# Patient Record
Sex: FEMALE | Race: WHITE | Employment: FULL TIME | URBAN - METROPOLITAN AREA
[De-identification: names, ages, dates, MRNs, and addresses within clinical notes are randomized per-mention and may not be internally consistent; named-entity substitution may affect disease eponyms.]

---

## 2023-04-07 ENCOUNTER — HOSPITAL ENCOUNTER (EMERGENCY)
Facility: HOSPITAL | Age: 23
Discharge: HOME | End: 2023-04-08
Attending: EMERGENCY MEDICINE

## 2023-04-07 DIAGNOSIS — K12.0 APHTHOUS ULCER: Primary | ICD-10-CM

## 2023-04-07 PROCEDURE — 99283 EMERGENCY DEPT VISIT LOW MDM: CPT

## 2023-04-08 VITALS
RESPIRATION RATE: 18 BRPM | WEIGHT: 130 LBS | SYSTOLIC BLOOD PRESSURE: 166 MMHG | OXYGEN SATURATION: 100 % | HEIGHT: 64 IN | BODY MASS INDEX: 22.2 KG/M2 | DIASTOLIC BLOOD PRESSURE: 101 MMHG | TEMPERATURE: 98 F | HEART RATE: 79 BPM

## 2023-04-08 RX ORDER — LIDOCAINE HYDROCHLORIDE 20 MG/ML
10 SOLUTION OROPHARYNGEAL ONCE
Status: COMPLETED | OUTPATIENT
Start: 2023-04-08 | End: 2023-04-08

## 2023-04-08 RX ORDER — BUPROPION HYDROCHLORIDE 300 MG/1
300 TABLET ORAL DAILY
COMMUNITY

## 2023-04-08 RX ORDER — LIDOCAINE HYDROCHLORIDE 20 MG/ML
SOLUTION OROPHARYNGEAL
Status: COMPLETED
Start: 2023-04-08 | End: 2023-04-08

## 2023-04-08 RX ORDER — TRAZODONE HYDROCHLORIDE 50 MG/1
50 TABLET ORAL NIGHTLY
COMMUNITY

## 2024-03-06 ENCOUNTER — TELEPHONE (OUTPATIENT)
Dept: FAMILY MEDICINE CLINIC | Facility: CLINIC | Age: 24
End: 2024-03-06

## 2024-03-06 RX ORDER — VALACYCLOVIR HYDROCHLORIDE 500 MG/1
500 TABLET, FILM COATED ORAL DAILY
Qty: 12 TABLET | Refills: 0 | Status: SHIPPED | OUTPATIENT
Start: 2024-03-06 | End: 2024-03-18

## 2024-03-06 NOTE — TELEPHONE ENCOUNTER
Left detailed message to voicemail of note below. Patient was advised to call office back if plans to est care with Dr. Pena at [facility], and/or with any questions/concerns.

## 2024-03-06 NOTE — TELEPHONE ENCOUNTER
Aviva Gamino MD  P Aviva Gamino Nurse  Please check of pt wishes to establish here          Previous Messages       ----- Message -----  From: [email or web address] ([email or web address])  Sent: 3/5/2024   5:34 PM CST  To: Aviva Gamino MD ([facility])  Subject: Hannah Horn - Emergency Department Discharge (A03)

## 2024-03-06 NOTE — TELEPHONE ENCOUNTER
Please see note below  Care Everywhere updated    LOV 12/02/21 w/ Dr. Pena  Unknown last annual px  Last labs 11/30/21  Last refill unknown - valcyclovir 500 mg tabs    Future Appointments   Date Time Provider Department Center   4/3/2024  2:00 PM Aviva Gamino MD [facility]     Order(s) pending, please review. Thank you.  Norma Deshpande

## 2024-03-06 NOTE — TELEPHONE ENCOUNTER
Pt is establishing care.  Would like a refill of:    Valacyclovier - 500 mg and usually gets 12    Please send to:  [id number] [place] [place] [address] AT [facility] & [facility] [address], [phone], [phone] [[id number]]       Future Appointments   Date Time Provider Department Center   4/3/2024  2:00 PM Aviva Gamino MD [facility] [place]

## 2024-03-07 ENCOUNTER — TELEPHONE (OUTPATIENT)
Dept: CASE MANAGEMENT | Facility: HOSPITAL | Age: 24
End: 2024-03-07

## 2024-03-08 NOTE — CM/SW NOTE
Received a call from [facility] Pharmacist Aakash stating patient is presenting a magic mouthwash prescription dated 4/7/2023.Aakash/Pharmacist wanted to check if patient was in ER today and if prescription was given for magic mouthwash. EDC checked and patient last visit here was 4/7/2023 not today. Pharmacist Aakash verbalized understanding.

## 2024-03-13 ENCOUNTER — TELEPHONE (OUTPATIENT)
Dept: FAMILY MEDICINE CLINIC | Facility: CLINIC | Age: 24
End: 2024-03-13

## 2024-03-13 RX ORDER — FLUCONAZOLE 150 MG/1
150 TABLET ORAL ONCE
Qty: 1 TABLET | Refills: 0 | Status: SHIPPED | OUTPATIENT
Start: 2024-03-13 | End: 2024-03-13

## 2024-03-13 NOTE — TELEPHONE ENCOUNTER
Advised patient of Dr. Pena's note below. Patient verbalized understanding and states has IUD. No further questions at this time.    Rx resent to Norma Caldwell per pt request

## 2024-03-13 NOTE — TELEPHONE ENCOUNTER
[facility] #[id number] - [place], [place] - [address], [phone], [phone] [address] [facility] [address]   Phone: [phone] Fax: [phone]   Hours: Not open 24 hours       PATIENT WAS AT ER FOR CAT BITE/SCRATCH AND RECEIVED ANTIBIOTICS. PATIENT SAYS THE ANTIBIOTICS GAVE HER THRUSH AND YEAST INFECTION. ASKING IF DR QUINN CAN PRESCRIBE SOMETHING FOR THIS.

## 2024-03-13 NOTE — TELEPHONE ENCOUNTER
Please see note below    LOV 12/02/21 with Dr. Pena  Future Appointments   Date Time Provider Department Center   4/3/2024  2:00 PM Aviva Gamino MD [facility]     Pt requesting Rx for yeast infection and thrush    Please advise, thank you

## 2024-03-15 NOTE — TELEPHONE ENCOUNTER
[facility] #[id number] - [place], [place] - [address] AT [facility] [address], [phone], [phone] [address] [facility] [address]   Phone: [phone] Fax: [phone]   Hours: Not open 24 hours     PATIENT ASKING IF SHE CAN HAVE ONE MORE REFILL ON VALACYCLOVIR 500MG QD

## 2024-03-16 RX ORDER — VALACYCLOVIR HYDROCHLORIDE 500 MG/1
500 TABLET, FILM COATED ORAL DAILY
Qty: 12 TABLET | Refills: 0 | Status: SHIPPED | OUTPATIENT
Start: 2024-03-16 | End: 2024-03-28

## 2024-03-16 NOTE — TELEPHONE ENCOUNTER
valACYclovir 500 MG Oral Tab          Possible duplicate: [facility] to review recent actions on this medication    Sig: Take 1 tablet (500 mg total) by mouth daily for 12 doses.    Disp: 12 tablet    Refills: 0    Start: 3/15/2024 - 3/27/2024    Class: Normal    Last ordered: 1 week ago (3/6/2024) by Aviva Gamino MD    Herpes Agent Protocol Fnhsmp06/15/2024 04:38 PM   Protocol Details In person appointment or virtual visit in the past 12 mos or appointment in next 3 mos      To be filled at: [facility] #[id number] - [place], [place] - [address]  AT [facility] & [facility] [address], [phone], [phone]       Last refill 3/6/24    LOV 12/2/21    Medication past protocol    Medication sent